# Patient Record
(demographics unavailable — no encounter records)

---

## 2024-12-11 NOTE — CONSULT LETTER
[Dear  ___] : Dear  [unfilled], [Please see my note below.] : Please see my note below. [Sincerely,] : Sincerely, [FreeTextEntry3] : Seng [DrMacie  ___] : Dr. YOUNG

## 2024-12-11 NOTE — PHYSICAL EXAM
[General Appearance - Alert] : alert [General Appearance - In No Acute Distress] : in no acute distress [Sclera] : the sclera and conjunctiva were normal [Extraocular Movements] : extraocular movements were intact [] : no respiratory distress [Respiration, Rhythm And Depth] : normal respiratory rhythm and effort [Exaggerated Use Of Accessory Muscles For Inspiration] : no accessory muscle use [Auscultation Breath Sounds / Voice Sounds] : lungs were clear to auscultation bilaterally [Heart Rate And Rhythm] : heart rate was normal and rhythm regular [Heart Sounds] : normal S1 and S2 [Heart Sounds Gallop] : no gallops [Murmurs] : no murmurs [Heart Sounds Pericardial Friction Rub] : no pericardial rub [Edema] : there was no peripheral edema [Bowel Sounds] : normal bowel sounds [Abdomen Soft] : soft [FreeTextEntry1] : obese [Abnormal Walk] : normal gait [Involuntary Movements] : no involuntary movements were seen [Skin Color & Pigmentation] : normal skin color and pigmentation [Skin Turgor] : normal skin turgor [No Focal Deficits] : no focal deficits [Oriented To Time, Place, And Person] : oriented to person, place, and time [Impaired Insight] : insight and judgment were intact [Affect] : the affect was normal [Mood] : the mood was normal [Over the Past 2 Weeks, Have You Felt Down, Depressed, or Hopeless?] : 1.) Over the past 2 weeks, have you felt down, depressed, or hopeless? No [Over the Past 2 Weeks, Have You Felt Little Interest or Pleasure Doing Things?] : 2.) Over the past 2 weeks, have you felt little interest or pleasure doing things? No

## 2024-12-11 NOTE — REVIEW OF SYSTEMS
[As Noted in HPI] : as noted in HPI [Lower Ext Edema] : lower extremity edema [Hesitancy] : no urinary hesitancy [Nocturia] : nocturia [Negative] : Heme/Lymph [FreeTextEntry8] : x 3-4.  Drinks a lot of water. [de-identified] : (+) neuropathy in feet and hands without change.  He saw Dr. Bryson Soto.

## 2024-12-11 NOTE — HISTORY OF PRESENT ILLNESS
[FreeTextEntry1] : Juanpablo Hammer is a 66-year-old gentleman who is followed in this office for his difficult-to-control hypertension and hypokalemia. Prior workup demonstrated and elevated aldosterone level, and aldosterone/PRA ratio of 70.  Juanpablo is here for follow up. Juanpablo underwent weight reduction surgery on April 27, 2017. He gained weight during the COVID pandemic.  He lost and then gained weight.  He was started on Mounjaro in June 2024 and has since lost around 45 pounds.  He does not drink alcohol often.

## 2024-12-11 NOTE — ASSESSMENT
[FreeTextEntry1] : ! ! Juanpablo Hammer is a 66-year-old gentleman followed for his difficult-to-control hypertension, hypokalemia, and nephrotic-range proteinuria. His last plasma aldosterone to plasma renin activity ratio was 93.7 with an elevated aldosterone level suggestive of a hyper-aldosterone syndrome. The last CT of the adrenal glands did not demonstrate any adrenal masses. This presentation was suggestive of bilateral adrenal hyperplasia. Juanpablo has had proteinuria in the past which I have suspected was nephrotic-range off of the ARB.  He underwent weight reduction surgery. He lost 40 pounds and gained it back. He has since started on Mounjaro and lost 45 pounds.  Overall, he is doing well.    (1) Stage III-IV chronic kidney disease. The serum creatinine increased after starting chlorthalidone. The BUN/creatinine levels have trended as follows: 38/2.19 (02/22/2018), 44/2.49 (04/09/2018), 51/2.46 (08/14/2018), 36/2.65 (11/12/2018), 41/2.27 (03/05/2019), 44/2.54 (09/11/2019), 45/2.54 (02/27/2020), 33/2.25 (08/17/2020), 37/2.58 (02/10/2021), 38/2.42 (08/19/2021), 45/2.78 (02/16/2022), 45/2.86 (04/21/2022), 36/2.46 (08/19/2022), 33/2.69 (01/23/2023), 38/2.29 (07/13/2023) ,54/3.07 (08/15/2023), 56/2.95 (09/13/2024), 56/3.56 (01/13/2024) --> 39/3.06 (08/23/2024), 31/2.95 (12/04/2024). The eGFR is 23 mL per minute.  The UPCR is 352 mg per gram (UACR 105 mg per gram).  The medications (spironolactone, Jardiance) are contributors to the elevated serum creatinine.  The eGFR is likely a gross underestimation of the true GFR (thusly stage III-IV CKD).    (2) Metabolic acidosis. The serum CO2 has normalized at 23 meq per liter.    RECOMMENDATIONS:  (1) Stay well hydrated. (2) Avoid the use of NSAIDS. (3) No need to add sodium bicarbonate at this time. (4) Continue spironolactone and Jardiance. (5) Agree with Mounjaro for weight loss.  (6) Return to me in 4-5 months with a CMP, CBC, phosphorous, magnesium, uric acid, urinalysis and a random urine for total protein and creatinine, and a 25 hydroxy vitamin D level.

## 2025-04-29 NOTE — REVIEW OF SYSTEMS
[As Noted in HPI] : as noted in HPI [Recent Weight Loss (___ Lbs)] : recent [unfilled] ~Ulb weight loss [Lower Ext Edema] : lower extremity edema [Nocturia] : nocturia [Negative] : Heme/Lymph [Hesitancy] : no urinary hesitancy [FreeTextEntry2] : decreased appetite on Mounjaro. [FreeTextEntry8] : x 2.  Drinks a lot of water. [de-identified] : (+) neuropathy in feet and hands without change.  He saw Dr. Bryson Soto.   Dizziness when standing.  Beta blocker discontinued.

## 2025-04-29 NOTE — PHYSICAL EXAM
[General Appearance - Alert] : alert [General Appearance - In No Acute Distress] : in no acute distress [Sclera] : the sclera and conjunctiva were normal [Extraocular Movements] : extraocular movements were intact [] : no respiratory distress [Respiration, Rhythm And Depth] : normal respiratory rhythm and effort [Exaggerated Use Of Accessory Muscles For Inspiration] : no accessory muscle use [Auscultation Breath Sounds / Voice Sounds] : lungs were clear to auscultation bilaterally [Heart Rate And Rhythm] : heart rate was normal and rhythm regular [Heart Sounds] : normal S1 and S2 [Heart Sounds Gallop] : no gallops [Murmurs] : no murmurs [Heart Sounds Pericardial Friction Rub] : no pericardial rub [Edema] : there was no peripheral edema [Bowel Sounds] : normal bowel sounds [Abdomen Soft] : soft [Abnormal Walk] : normal gait [Involuntary Movements] : no involuntary movements were seen [Skin Color & Pigmentation] : normal skin color and pigmentation [Skin Turgor] : normal skin turgor [No Focal Deficits] : no focal deficits [Oriented To Time, Place, And Person] : oriented to person, place, and time [Impaired Insight] : insight and judgment were intact [Affect] : the affect was normal [Mood] : the mood was normal [FreeTextEntry1] : obese [Over the Past 2 Weeks, Have You Felt Down, Depressed, or Hopeless?] : 1.) Over the past 2 weeks, have you felt down, depressed, or hopeless? No [Over the Past 2 Weeks, Have You Felt Little Interest or Pleasure Doing Things?] : 2.) Over the past 2 weeks, have you felt little interest or pleasure doing things? No

## 2025-04-29 NOTE — ASSESSMENT
[FreeTextEntry1] : ! ! Juanpablo Hammer is a 66-year-old gentleman followed for his difficult-to-control hypertension, hypokalemia, and nephrotic-range proteinuria. His last plasma aldosterone to plasma renin activity ratio was 93.7 with an elevated aldosterone level suggestive of a hyper-aldosterone syndrome. The last CT of the adrenal glands did not demonstrate any adrenal masses. This presentation was suggestive of bilateral adrenal hyperplasia. Juanpablo has had proteinuria in the past which I have suspected was nephrotic-range off of the ARB. He underwent weight reduction surgery. He lost 40 pounds and gained it back. He has since started on Mounjaro and lost 45 pounds. Overall, he is doing well.  (1) Stage III-IV chronic kidney disease. The serum creatinine increased after starting chlorthalidone. The BUN/creatinine levels have trended as follows: 38/2.19 (02/22/2018), 44/2.49 (04/09/2018), 51/2.46 (08/14/2018), 36/2.65 (11/12/2018), 41/2.27 (03/05/2019), 44/2.54 (09/11/2019), 45/2.54 (02/27/2020), 33/2.25 (08/17/2020), 37/2.58 (02/10/2021), 38/2.42 (08/19/2021), 45/2.78 (02/16/2022), 45/2.86 (04/21/2022), 36/2.46 (08/19/2022), 33/2.69 (01/23/2023), 38/2.29 (07/13/2023) ,54/3.07 (08/15/2023), 56/2.95 (09/13/2024), 56/3.56 (01/13/2024) --> 39/3.06 (08/23/2024), 31/2.95 (12/04/2024), 42/3.56 (04/24/2025). The eGFR is 18 mL per minute. The UPCR on the last 2 sets of urine studies were 352 mg/g and most recently 493 mg/g.  The medications (spironolactone, Jardiance) are contributors to the elevated serum creatinine. The eGFR is likely a gross underestimation of the true GFR (thusly stage III-IV CKD).  (2) Metabolic acidosis. The serum CO2 has normalized at 23 meq per liter.  (3) Orthostatic dizziness.  Improved with discontinuation of the beta bloker.  (4) Anemia  RECOMMENDATIONS:  (1) Stay well hydrated. (2) Avoid the use of NSAIDS. (3) No need to add sodium bicarbonate at this time. (4) Continue spironolactone and Jardiance. (5) continue Mounjaro for weight loss.  I asked Juanpablo to make sure to stay well hydrated.  He will repeat his lab studies in 1 month.  If the serum creatinine continues to increase, will consider cutting back on the spironolactone dose.  If the serum creatinine improves and the blood pressures decrease further, will lower the nifedipine ER dose.   Repeat the following lab studies in 1 month: CMP, CBC, phosphorous, uric acid, urinalysis and a random urine for total protein and creatinine, and a 25 hydroxy vitamin D level, iron, TIBC, ferritin.

## 2025-04-29 NOTE — HISTORY OF PRESENT ILLNESS
[FreeTextEntry1] : Juanpablo Hammer is a 66-year-old gentleman who is followed in this office for his difficult-to-control hypertension and hypokalemia. Prior workup demonstrated and elevated aldosterone level, and aldosterone/PRA ratio of 70. He is also followed in this office for CKD and proteinuria.   Juanpablo is here for follow up. Juanpablo underwent weight reduction surgery on April 27, 2017. He gained weight during the COVID pandemic.  He lost and then gained weight.  He was started on Mounjaro in June 2024 and has since lost around 75 pounds.  He stopped drinking alcohol.

## 2025-04-29 NOTE — CONSULT LETTER
[Dear  ___] : Dear  [unfilled], [Please see my note below.] : Please see my note below. [Sincerely,] : Sincerely, [DrMacie  ___] : Dr. YOUNG [FreeTextEntry3] : Seng

## 2025-06-18 NOTE — PHYSICAL EXAM
[General Appearance - Alert] : alert [General Appearance - In No Acute Distress] : in no acute distress [Sclera] : the sclera and conjunctiva were normal [Extraocular Movements] : extraocular movements were intact [] : no respiratory distress [Respiration, Rhythm And Depth] : normal respiratory rhythm and effort [Exaggerated Use Of Accessory Muscles For Inspiration] : no accessory muscle use [Auscultation Breath Sounds / Voice Sounds] : lungs were clear to auscultation bilaterally [Heart Rate And Rhythm] : heart rate was normal and rhythm regular [Heart Sounds] : normal S1 and S2 [Heart Sounds Gallop] : no gallops [Murmurs] : no murmurs [Heart Sounds Pericardial Friction Rub] : no pericardial rub [Bowel Sounds] : normal bowel sounds [Abdomen Soft] : soft [FreeTextEntry1] : obese [Abnormal Walk] : normal gait [Involuntary Movements] : no involuntary movements were seen [Skin Color & Pigmentation] : normal skin color and pigmentation [Skin Turgor] : normal skin turgor [No Focal Deficits] : no focal deficits [Oriented To Time, Place, And Person] : oriented to person, place, and time [Impaired Insight] : insight and judgment were intact [Affect] : the affect was normal [Mood] : the mood was normal [Over the Past 2 Weeks, Have You Felt Down, Depressed, or Hopeless?] : 1.) Over the past 2 weeks, have you felt down, depressed, or hopeless? No [Over the Past 2 Weeks, Have You Felt Little Interest or Pleasure Doing Things?] : 2.) Over the past 2 weeks, have you felt little interest or pleasure doing things? No

## 2025-06-18 NOTE — HISTORY OF PRESENT ILLNESS
[FreeTextEntry1] : Juanpablo Hammer is a 66-year-old gentleman who is followed in this office for his difficult-to-control hypertension, hypokalemia, and CKD with proteinuria. Prior workup demonstrated and elevated aldosterone level, and aldosterone/PRA ratio of 70.   Juanpablo is here for follow up. Juanpablo underwent weight reduction surgery on April 27, 2017. He gained weight during the COVID pandemic.  He lost and then gained weight.  He was started on Mounjaro in June 2024 and has since lost around 85 pounds.  He stopped drinking alcohol.  Since his last visit,  Nebivolol was discontinued due to dizziness.  Spironolactone was discontiued around 6 weeks ago because of dizziness- this has improved though has not yet fully resolved.

## 2025-06-18 NOTE — REVIEW OF SYSTEMS
[As Noted in HPI] : as noted in HPI [Recent Weight Loss (___ Lbs)] : recent [unfilled] ~Ulb weight loss [Lower Ext Edema] : lower extremity edema [Hesitancy] : no urinary hesitancy [Nocturia] : nocturia [Negative] : Heme/Lymph [FreeTextEntry2] : decreased appetite on Mounjaro. [FreeTextEntry4] : sinus issues treated by Dr. Meraz.  The symptoms are worse during the winter.  [FreeTextEntry8] : x 2.  Drinks a lot of water. [de-identified] : (+) neuropathy in feet and hands without change.  He saw Dr. Bryson Soto.

## 2025-06-18 NOTE — ASSESSMENT
[FreeTextEntry1] : ! ! Juanpablo Hammer is a 66-year-old gentleman followed for his difficult-to-control hypertension, hypokalemia, and nephrotic-range proteinuria. His last plasma aldosterone to plasma renin activity ratio was 93.7 with an elevated aldosterone level suggestive of a hyper-aldosterone syndrome. The last CT of the adrenal glands did not demonstrate any adrenal masses. This presentation was suggestive of bilateral adrenal hyperplasia. Juanpablo has had proteinuria in the past which I have suspected was nephrotic-range off of the ARB. He underwent weight reduction surgery. He lost 40 pounds and gained it back. He has since started on Mounjaro and lost ~85 pounds. Overall, he is doing well.  (1) Stage III-IV chronic kidney disease. The serum creatinine increased after starting chlorthalidone. The BUN/creatinine levels have trended as follows: 38/2.19 (02/22/2018), 44/2.49 (04/09/2018), 51/2.46 (08/14/2018), 36/2.65 (11/12/2018), 41/2.27 (03/05/2019), 44/2.54 (09/11/2019), 45/2.54 (02/27/2020), 33/2.25 (08/17/2020), 37/2.58 (02/10/2021), 38/2.42 (08/19/2021), 45/2.78 (02/16/2022), 45/2.86 (04/21/2022), 36/2.46 (08/19/2022), 33/2.69 (01/23/2023), 38/2.29 (07/13/2023) ,54/3.07 (08/15/2023), 56/2.95 (09/13/2024), 56/3.56 (01/13/2024) --> 39/3.06 (08/23/2024), 31/2.95 (12/04/2024), 42/3.56 (04/24/2025) --> 30/2.83 (05/29/2025). The eGFR is 24 mL per minute. The UPCR on the last 2 sets of urine studies were 352 mg/g and most recently 493 mg/g. I do not have a recent value.   The medications (Jardiance) is a possible contributor to the elevated serum creatinine. The eGFR is likely a gross underestimation of the true GFR (thusly stage III-IV CKD).  (2) Metabolic acidosis. The serum CO2 has normalized at 20 meq per liter.  (3) Orthostatic dizziness. Improved with discontinuation of the beta bloker and spirnolactone.   (4) Anemia. The recent Hb value was 9.9 g/dL.  The iron stores are appropriate (TSAT 24%, ferritin 278 ng/mL).  The vitamin B12 value is elevated (1797 pg/mL).   (5) Hypertension.  Excellent control.  RECOMMENDATIONS:  (1) Stay well hydrated. (2) Avoid the use of NSAIDS. (3) No need to add sodium bicarbonate at this time. (4) Continue Jardiance. Stay off of spironolactone.  (5) Continue Mounjaro for weight loss. (6) Lower dose of nifedipine from 90 to 60 mg daily.  (7) Stop vitamin B12 for now.  Repeat value again in 3 months.  I asked Juanpablo to make sure to stay well hydrated. He will repeat his lab studies in 1 month. If the serum creatinine continues to increase, will consider cutting back on the spironolactone dose. If the serum creatinine improves and the blood pressures decrease further, will lower the nifedipine ER dose.  Repeat the following lab studies in 1 month: CMP, CBC, phosphorous, uric acid, urinalysis and a random urine for total protein and creatinine, and a 25 hydroxy vitamin D level, iron, TIBC, ferritin.      
Laparoscopic Gastric Band Removal